# Patient Record
Sex: MALE | Race: WHITE | NOT HISPANIC OR LATINO | Employment: OTHER | URBAN - METROPOLITAN AREA
[De-identification: names, ages, dates, MRNs, and addresses within clinical notes are randomized per-mention and may not be internally consistent; named-entity substitution may affect disease eponyms.]

---

## 2017-01-03 ENCOUNTER — TELEPHONE (OUTPATIENT)
Dept: HEMATOLOGY/ONCOLOGY | Facility: CLINIC | Age: 47
End: 2017-01-03

## 2017-01-03 NOTE — TELEPHONE ENCOUNTER
Called patient to inform him his Pet scan scheduled on 1/18/17 has been  Denied by his insurance carrier [employer] to this not being a covered benefit for members 21 and older in [place]. Informed patient, Dr Hanna may order a Ct scan instead. We still awaiting a reply back from the Dr, the next plan. Stated in the message, I will follow up with Mr August. No answer, the above information left on patient voice mail.

## 2017-01-18 ENCOUNTER — LAB VISIT (OUTPATIENT)
Dept: LAB | Facility: HOSPITAL | Age: 47
End: 2017-01-18
Attending: INTERNAL MEDICINE
Payer: MEDICAID

## 2017-01-18 DIAGNOSIS — N18.30 CKD (CHRONIC KIDNEY DISEASE) STAGE 3, GFR 30-59 ML/MIN: Chronic | ICD-10-CM

## 2017-01-18 DIAGNOSIS — C81.91: ICD-10-CM

## 2017-01-18 DIAGNOSIS — F41.9 ANXIETY: Chronic | ICD-10-CM

## 2017-01-18 LAB
ALBUMIN SERPL BCP-MCNC: 3.5 G/DL
ALP SERPL-CCNC: 95 U/L
ALT SERPL W/O P-5'-P-CCNC: 16 U/L
ANION GAP SERPL CALC-SCNC: 5 MMOL/L
AST SERPL-CCNC: 17 U/L
BILIRUB SERPL-MCNC: 0.4 MG/DL
BUN SERPL-MCNC: 32 MG/DL
CALCIUM SERPL-MCNC: 8.4 MG/DL
CHLORIDE SERPL-SCNC: 115 MMOL/L
CO2 SERPL-SCNC: 21 MMOL/L
CREAT SERPL-MCNC: 3.2 MG/DL
ERYTHROCYTE [DISTWIDTH] IN BLOOD BY AUTOMATED COUNT: 14.6 %
EST. GFR  (AFRICAN AMERICAN): 25.1 ML/MIN/1.73 M^2
EST. GFR  (NON AFRICAN AMERICAN): 21.7 ML/MIN/1.73 M^2
GLUCOSE SERPL-MCNC: 139 MG/DL
HCT VFR BLD AUTO: 34.1 %
HGB BLD-MCNC: 11 G/DL
MCH RBC QN AUTO: 33.8 PG
MCHC RBC AUTO-ENTMCNC: 32.3 %
MCV RBC AUTO: 105 FL
NEUTROPHILS # BLD AUTO: 4.7 K/UL
PLATELET # BLD AUTO: 303 K/UL
PMV BLD AUTO: 9.6 FL
POTASSIUM SERPL-SCNC: 3.5 MMOL/L
PROT SERPL-MCNC: 6.6 G/DL
RBC # BLD AUTO: 3.25 M/UL
SODIUM SERPL-SCNC: 141 MMOL/L
WBC # BLD AUTO: 8.13 K/UL

## 2017-01-18 PROCEDURE — 85027 COMPLETE CBC AUTOMATED: CPT

## 2017-01-18 PROCEDURE — 36415 COLL VENOUS BLD VENIPUNCTURE: CPT

## 2017-01-18 PROCEDURE — 80053 COMPREHEN METABOLIC PANEL: CPT

## 2017-01-31 DIAGNOSIS — C81.00 NODULAR LYMPHOCYTE PREDOMINANT HODGKIN LYMPHOMA, UNSPECIFIED BODY REGION: ICD-10-CM

## 2017-01-31 RX ORDER — ALPRAZOLAM 1 MG/1
TABLET ORAL
Qty: 30 TABLET | Refills: 0 | Status: SHIPPED | OUTPATIENT
Start: 2017-01-31 | End: 2017-02-22 | Stop reason: SDUPTHER

## 2017-01-31 NOTE — TELEPHONE ENCOUNTER
----- Message from Desi Cuellar MA sent at 1/31/2017  1:25 PM CST -----  Pt need refill on zanax, pt want to be called back at [phone]

## 2017-01-31 NOTE — TELEPHONE ENCOUNTER
Returned call and spoke with Mr. August. Patient previously scheduled for a PET scan, but due to patient having Medicaid, the test was not authorized. Please enter order for covered scans.     Patient also requesting a refill on his Xanax. I forward to you a refill request.

## 2017-02-10 DIAGNOSIS — C81.90 HODGKIN LYMPHOMA, UNSPECIFIED HODGKIN LYMPHOMA TYPE, UNSPECIFIED BODY REGION: Primary | ICD-10-CM

## 2017-02-16 ENCOUNTER — HOSPITAL ENCOUNTER (OUTPATIENT)
Dept: RADIOLOGY | Facility: HOSPITAL | Age: 47
Discharge: HOME | End: 2017-02-16
Attending: NURSE PRACTITIONER
Payer: MEDICAID

## 2017-02-16 DIAGNOSIS — C81.90 HODGKIN LYMPHOMA, UNSPECIFIED HODGKIN LYMPHOMA TYPE, UNSPECIFIED BODY REGION: ICD-10-CM

## 2017-02-16 PROCEDURE — 70490 CT SOFT TISSUE NECK W/O DYE: CPT | Mod: 26,,, | Performed by: RADIOLOGY

## 2017-02-16 PROCEDURE — 25500020 PHARM REV CODE 255: Performed by: NURSE PRACTITIONER

## 2017-02-16 PROCEDURE — 70490 CT SOFT TISSUE NECK W/O DYE: CPT | Mod: TC

## 2017-02-16 PROCEDURE — 71250 CT THORAX DX C-: CPT | Mod: TC

## 2017-02-16 PROCEDURE — 74176 CT ABD & PELVIS W/O CONTRAST: CPT | Mod: TC

## 2017-02-16 PROCEDURE — 71250 CT THORAX DX C-: CPT | Mod: 26,,, | Performed by: RADIOLOGY

## 2017-02-16 PROCEDURE — 74176 CT ABD & PELVIS W/O CONTRAST: CPT | Mod: 26,,, | Performed by: RADIOLOGY

## 2017-02-16 RX ADMIN — IOHEXOL 15 ML: 350 INJECTION, SOLUTION INTRAVENOUS at 01:02

## 2017-02-16 RX ADMIN — IOHEXOL 15 ML: 350 INJECTION, SOLUTION INTRAVENOUS at 02:02

## 2017-02-22 ENCOUNTER — OFFICE VISIT (OUTPATIENT)
Dept: HEMATOLOGY/ONCOLOGY | Facility: CLINIC | Age: 47
End: 2017-02-22
Payer: MEDICAID

## 2017-02-22 VITALS
BODY MASS INDEX: 23.01 KG/M2 | HEART RATE: 72 BPM | TEMPERATURE: 98 F | WEIGHT: 160.69 LBS | RESPIRATION RATE: 18 BRPM | SYSTOLIC BLOOD PRESSURE: 138 MMHG | DIASTOLIC BLOOD PRESSURE: 77 MMHG | HEIGHT: 70 IN

## 2017-02-22 DIAGNOSIS — C81.00 NODULAR LYMPHOCYTE PREDOMINANT HODGKIN LYMPHOMA, UNSPECIFIED BODY REGION: ICD-10-CM

## 2017-02-22 DIAGNOSIS — K21.9 GASTROESOPHAGEAL REFLUX DISEASE, ESOPHAGITIS PRESENCE NOT SPECIFIED: ICD-10-CM

## 2017-02-22 DIAGNOSIS — E87.6 HYPOKALEMIA: ICD-10-CM

## 2017-02-22 DIAGNOSIS — C81.90 HODGKIN LYMPHOMA, UNSPECIFIED HODGKIN LYMPHOMA TYPE, UNSPECIFIED BODY REGION: Primary | ICD-10-CM

## 2017-02-22 DIAGNOSIS — N18.30 CKD (CHRONIC KIDNEY DISEASE) STAGE 3, GFR 30-59 ML/MIN: Chronic | ICD-10-CM

## 2017-02-22 DIAGNOSIS — M25.551 RIGHT HIP PAIN: ICD-10-CM

## 2017-02-22 DIAGNOSIS — F41.9 ANXIETY: Chronic | ICD-10-CM

## 2017-02-22 PROCEDURE — 99999 PR PBB SHADOW E&M-EST. PATIENT-LVL III: CPT | Mod: PBBFAC,,, | Performed by: INTERNAL MEDICINE

## 2017-02-22 PROCEDURE — 99213 OFFICE O/P EST LOW 20 MIN: CPT | Mod: PBBFAC | Performed by: INTERNAL MEDICINE

## 2017-02-22 PROCEDURE — 99215 OFFICE O/P EST HI 40 MIN: CPT | Mod: S$PBB,,, | Performed by: INTERNAL MEDICINE

## 2017-02-22 RX ORDER — ALPRAZOLAM 1 MG/1
TABLET ORAL
Qty: 30 TABLET | Refills: 0 | Status: SHIPPED | OUTPATIENT
Start: 2017-02-22 | End: 2017-03-29 | Stop reason: SDUPTHER

## 2017-02-22 RX ORDER — PANTOPRAZOLE SODIUM 20 MG/1
20 TABLET, DELAYED RELEASE ORAL DAILY
Qty: 30 TABLET | Refills: 0 | Status: SHIPPED | OUTPATIENT
Start: 2017-02-22 | End: 2018-06-18

## 2017-02-22 NOTE — PROGRESS NOTES
"PATIENT: Erick August  MRN: [id number]  DATE: 2/22/2017    Subjective:     Chief complaint:  Chief Complaint   Patient presents with    Lymphoma       Oncologic History:  Mr. August is here for FU. He has a newly diagnosed IIIB HD with ureteric obstruction and ?vertebral involvement. He was admitted on presentation. He was treated with ICE as he has a marginal EF and he may have cardiac infiltration with disease. He has B/L per cutaneous nehrostomoies He was given C5 of chemo 5/16.. He received neulasta after. His insurance has refused to authorize a PET as of yet. However his CT pre C5 revealed a MA. During admission, we removed his per cutaneous drains were removed. As he could not get a PET, I ordered a CT guided Bx of his residual radiological mass after his 6th cycle of ICE 4 months ago.  This revealed   LEFT RETROPERITONEAL MASS (NEEDLE BIOPSY WITH PATHOLOGIST ADEQUACY):  -No evidence of malignancy in sampled tissue  -No evidence of lymphoma  I ordered a Gallium scan on him as his PET was not approved to evaluaate the RD in the Retroperitoneum.His gallium scan fails to reveal reveal any activity. Hence it appears he is in a complete remission. I elected not to give him involved field radiotherapy to the retroperitoneum. He does have chronic renal insufficiency and his kidney may be in the field if she would have gotten radiated. Also due to the fact that he had a negative biopsy we will observe him at this point.  At this point however he is in a complete remission.     Interval History: Mr. August returns for follow up. He had CT scans 2/16/17 which reveal "No evidence of acute pathology. Interval improvement of bilateral hydronephrosis when compared to CT examination 10/05/2016, now mild."  He is feeling well. He reports pain to right hip which he rates a 3/10 pain scale. The pain started about 1 1/2 months ago. He continues to have morning nausea relieved with protonix and food. He denies any " "vomiting, diarrhea, constipation, abdominal pain, weight loss or loss of appetite, chest pain, shortness of breath, leg swelling, fatigue, headache, dizziness, or mood changes. He needs refills on protonix and xanax.    PMFSH: all information reviewed and updated as relevant to today's visit    Review of Systems:   Review of Systems   Constitutional: Negative for activity change, appetite change, fatigue and fever.   HENT: Negative for mouth sores, nosebleeds and sore throat.    Eyes: Negative for visual disturbance.   Respiratory: Negative for cough and shortness of breath.    Cardiovascular: Negative for chest pain, palpitations and leg swelling.   Gastrointestinal: Negative for abdominal pain, constipation, diarrhea, nausea and vomiting.   Genitourinary: Negative for difficulty urinating and frequency.   Musculoskeletal: Negative for arthralgias and back pain.   Skin: Negative for rash.   Neurological: Negative for dizziness, numbness and headaches.   Hematological: Negative for adenopathy. Does not bruise/bleed easily.   Psychiatric/Behavioral: Negative for confusion and sleep disturbance. The patient is not nervous/anxious.    All other systems reviewed and are negative.        Objective:      Vitals:   Vitals:    02/22/17 1452   BP: 138/77   BP Location: Left arm   Patient Position: Sitting   BP Method: Automatic   Pulse: 72   Resp: 18   Temp: 98 °F (36.7 °C)   Weight: 72.9 kg (160 lb 11.5 oz)   Height: 5' 10" (1.778 m)     BMI: Body mass index is 23.06 kg/(m^2).      Physical Exam:   Physical Exam   Constitutional: He is oriented to person, place, and time. He appears well-developed and well-nourished. No distress.   HENT:   Right Ear: External ear normal.   Left Ear: External ear normal.   Mouth/Throat: No oropharyngeal exudate.   Eyes: Conjunctivae and lids are normal. Pupils are equal, round, and reactive to light. No scleral icterus.   Neck: Trachea normal and normal range of motion. Neck supple. No " thyromegaly present.   Cardiovascular: Normal rate, regular rhythm, normal heart sounds and normal pulses.    Pulmonary/Chest: Effort normal and breath sounds normal.   Abdominal: Soft. Normal appearance and bowel sounds are normal. He exhibits no distension and no mass. There is no hepatosplenomegaly or splenomegaly. There is no tenderness.   Musculoskeletal: Normal range of motion.   No pain on palpation to right hip; ROM normal.    Lymphadenopathy:        Head (right side): No submental and no submandibular adenopathy present.        Head (left side): No submental and no submandibular adenopathy present.     He has no cervical adenopathy.     He has no axillary adenopathy.        Right: No supraclavicular adenopathy present.        Left: No supraclavicular adenopathy present.   Neurological: He is alert and oriented to person, place, and time. He has normal reflexes. No sensory deficit.   Skin: Skin is warm, dry and intact. No bruising and no rash noted. Nails show no clubbing.   Psychiatric: He has a normal mood and affect. His speech is normal and behavior is normal. Cognition and memory are normal.   Vitals reviewed.        Laboratory Data:  WBC   Date Value Ref Range Status   02/16/2017 6.60 3.90 - 12.70 K/uL Final     Hemoglobin   Date Value Ref Range Status   02/16/2017 11.5 (L) 14.0 - 18.0 g/dL Final     Hematocrit   Date Value Ref Range Status   02/16/2017 35.8 (L) 40.0 - 54.0 % Final     Platelets   Date Value Ref Range Status   02/16/2017 326 150 - 350 K/uL Final     Gran #   Date Value Ref Range Status   02/16/2017 3.8 1.8 - 7.7 K/uL Final     Gran%   Date Value Ref Range Status   02/16/2017 57.8 38.0 - 73.0 % Final       Chemistry        Component Value Date/Time     02/16/2017 0917    K 2.9 (L) 02/16/2017 0917     02/16/2017 0917    CO2 24 02/16/2017 0917    BUN 24 (H) 02/16/2017 0917    CREATININE 3.1 (H) 02/16/2017 0917    GLU 90 02/16/2017 0917        Component Value Date/Time     CALCIUM 8.7 02/16/2017 0917    ALKPHOS 95 01/18/2017 1049    AST 17 01/18/2017 1049    ALT 16 01/18/2017 1049    BILITOT 0.4 01/18/2017 1049              Assessment/Plan:     1. Hodgkin lymphoma, unspecified Hodgkin lymphoma type, unspecified body region    2. CKD (chronic kidney disease) stage 3, GFR 30-59 ml/min    3. Anxiety    4. Right hip pain    5. Hypokalemia        Plan:   He is clinically stable and has no clinical evidence of disease. His CT,s without contrast are negative. His insurance refuses to allow a PET. He also has a very high deductable and can not afford to see me until dec. I will FU clinically and on LDH.    His renal dysfunction is due to Ifex toxixity and chronic obstructive uropathy from Disease. He may require HD. He is seeing Renal.    Hip pain is chronic and due to previous disease involvement.    I have not changed any meds.    Med and Orders:  Orders Placed This Encounter    alprazolam (XANAX) 1 MG tablet    pantoprazole (PROTONIX) 20 MG tablet       Follow Up:  Return in about 10 months (around 12/22/2017).    Patient instructions:   Patient Instructions   No change in meds    See me in Dec with cbc/cmp/LDH

## 2017-02-22 NOTE — MR AVS SNAPSHOT
[facility]  [address]  [facility] [address]  Phone: [phone]                  Erick August   2017 3:00 PM   Office Visit    Description:  Male : 1970   Provider:  Allison Hanna MD   Department:  [facility]           Reason for Visit     Lymphoma           Diagnoses this Visit        Comments    Hodgkin lymphoma, unspecified Hodgkin lymphoma type, unspecified body region    -  Primary     CKD (chronic kidney disease) stage 3, GFR 30-59 ml/min         Anxiety         Right hip pain         Hypokalemia         Nodular lymphocyte predominant Hodgkin lymphoma, unspecified body region         Gastroesophageal reflux disease, esophagitis presence not specified                To Do List           Goals (5 Years of Data)     None      Follow-Up and Disposition     Return in about 10 months (around 2017).       These Medications        Disp Refills Start End    alprazolam (XANAX) 1 MG tablet 30 tablet 0 2017     TAKE 1 TABLET BY MOUTH AT NIGHT AS NEEDED FOR INSOMNIA    Pharmacy: [facility]/pharmacy #[id number] - [place] - [address] [facility] Ph #: [phone]       Notes to Pharmacy: Not to exceed 5 additional fills before 2016.    pantoprazole (PROTONIX) 20 MG tablet 30 tablet 0 2017     Take 1 tablet (20 mg total) by mouth once daily. - Oral    Pharmacy: [facility]/pharmacy #[id number] - [place] - [address] [facility] Ph #: [phone]         [facility]     [facility] On Call Nurse Care Line -  Assistance  Registered nurses in the [facility] Center provide clinical advisement, health education, appointment booking, and other advisory services.  Call for this free service at [phone].             Medications           Message regarding Medications     Verify the changes and/or additions to your medication regime listed below are the same as discussed with your clinician today.  If any of these changes or additions are  "incorrect, please notify your healthcare provider.        CHANGE how you are taking these medications     Start Taking Instead of    pantoprazole (PROTONIX) 20 MG tablet pantoprazole (PROTONIX) 20 MG tablet    Dosage:  Take 1 tablet (20 mg total) by mouth once daily. Dosage:  TAKE 1 TABLET BY MOUTH EVERY DAY    Reason for Change:  Reorder            Verify that the below list of medications is an accurate representation of the medications you are currently taking.  If none reported, the list may be blank. If incorrect, please contact your healthcare provider. Carry this list with you in case of emergency.           Current Medications     alprazolam (XANAX) 1 MG tablet TAKE 1 TABLET BY MOUTH AT NIGHT AS NEEDED FOR INSOMNIA    hydrocodone-acetaminophen 5-325mg (NORCO) 5-325 mg per tablet Take 1 tablet by mouth every 4 (four) hours as needed.    methylphenidate (RITALIN) 20 MG tablet Take 20 mg by mouth 3 (three) times daily with meals.    naproxen (NAPROSYN) 500 MG tablet Take 1 tablet (500 mg total) by mouth 2 (two) times daily with meals.    pantoprazole (PROTONIX) 20 MG tablet Take 1 tablet (20 mg total) by mouth once daily.           Clinical Reference Information           Your Vitals Were     BP Pulse Temp Resp Height Weight    138/77 (BP Location: Left arm, Patient Position: Sitting, BP Method: Automatic) 72 98 °F (36.7 °C) 18 5' 10" (1.778 m) 72.9 kg (160 lb 11.5 oz)    BMI                23.06 kg/m2          Blood Pressure          Most Recent Value    BP  138/77      Allergies as of 2/22/2017     No Known Allergies      Immunizations Administered on Date of Encounter - 2/22/2017     None      Instructions    No change in meds    See me in Dec with cbc/cmp/LDH       Smoking Cessation     If you would like to quit smoking:   You may be eligible for free services if you are a [place] resident and started smoking cigarettes before September 1, 1988.  Call the [employer]oking [employer]essation [employer] ([employer]) toll free at (357) " 064-7884 or (608) 887-6281.   Call 1-800-QUIT-NOW if you do not meet the above criteria.            Language Assistance Services     ATTENTION: Language assistance services are available, free of charge. Please call [phone].      ATENCIÓN: Si habla yandyañol, tiene a connors disposición servicios gratuitos de asistencia lingüística. Llame al [phone].     CHÚ Ý: N?u b?n nói Ti?ng Vi?t, có các d?ch v? h? tr? ngôn ng? mi?n phí dành cho b?n. G?i s? [phone].         [facility] complies with applicable Federal civil rights laws and does not discriminate on the basis of race, color, national origin, age, disability, or sex.

## 2017-02-23 ENCOUNTER — TELEPHONE (OUTPATIENT)
Dept: HEMATOLOGY/ONCOLOGY | Facility: CLINIC | Age: 47
End: 2017-02-23

## 2017-02-23 DIAGNOSIS — C81.90 HODGKIN LYMPHOMA, UNSPECIFIED HODGKIN LYMPHOMA TYPE, UNSPECIFIED BODY REGION: Primary | ICD-10-CM

## 2017-02-23 RX ORDER — ONDANSETRON HYDROCHLORIDE 8 MG/1
8 TABLET, FILM COATED ORAL EVERY 12 HOURS PRN
Qty: 30 TABLET | Refills: 2 | Status: SHIPPED | OUTPATIENT
Start: 2017-02-23 | End: 2017-02-27

## 2017-02-23 NOTE — TELEPHONE ENCOUNTER
----- Message from Sekou Hernandez sent at 2/23/2017  8:04 AM CST -----  Contact: PT  Pharmacy did not receive medication alprazolam (XANAX) 1 MG tablet and zofran    Pharmacy: [phone]    Please call patient once it has been called in, [phone]

## 2017-03-29 DIAGNOSIS — C81.00 NODULAR LYMPHOCYTE PREDOMINANT HODGKIN LYMPHOMA, UNSPECIFIED BODY REGION: ICD-10-CM

## 2017-03-29 DIAGNOSIS — F41.9 ANXIETY: Chronic | ICD-10-CM

## 2017-03-29 RX ORDER — ALPRAZOLAM 1 MG/1
TABLET ORAL
Qty: 30 TABLET | Refills: 0 | Status: SHIPPED | OUTPATIENT
Start: 2017-03-29 | End: 2017-04-20 | Stop reason: SDUPTHER

## 2017-03-29 NOTE — TELEPHONE ENCOUNTER
----- Message from Katie Jim sent at 3/29/2017 12:09 PM CDT -----  Contact: Self  Patient needs xanax called in to be refilled.

## 2017-04-20 DIAGNOSIS — C81.00 NODULAR LYMPHOCYTE PREDOMINANT HODGKIN LYMPHOMA, UNSPECIFIED BODY REGION: ICD-10-CM

## 2017-04-20 DIAGNOSIS — F41.9 ANXIETY: Chronic | ICD-10-CM

## 2017-04-20 RX ORDER — ALPRAZOLAM 1 MG/1
TABLET ORAL
Qty: 30 TABLET | Refills: 0 | Status: SHIPPED | OUTPATIENT
Start: 2017-04-20 | End: 2017-06-06 | Stop reason: SDUPTHER

## 2017-04-20 NOTE — TELEPHONE ENCOUNTER
----- Message from Sekou Hernandez sent at 4/20/2017  9:05 AM CDT -----  Contact: PT  Need new Rx for medication alprazolam (XANAX) 1 MG tablet    Pharmacy: [phone]

## 2017-06-06 DIAGNOSIS — C81.00 NODULAR LYMPHOCYTE PREDOMINANT HODGKIN LYMPHOMA, UNSPECIFIED BODY REGION: ICD-10-CM

## 2017-06-06 DIAGNOSIS — F41.9 ANXIETY: Chronic | ICD-10-CM

## 2017-06-06 NOTE — TELEPHONE ENCOUNTER
----- Message from Stefany Roland sent at 6/6/2017  4:34 PM CDT -----  Contact: self  Pt is calling to get a refill for (Xanax) medication, pt will be using [facility].  Contact  number [phone]

## 2017-06-07 RX ORDER — ALPRAZOLAM 1 MG/1
TABLET ORAL
Qty: 30 TABLET | Refills: 0 | Status: SHIPPED | OUTPATIENT
Start: 2017-06-07 | End: 2017-07-06 | Stop reason: SDUPTHER

## 2017-06-22 ENCOUNTER — TELEPHONE (OUTPATIENT)
Dept: HEMATOLOGY/ONCOLOGY | Facility: CLINIC | Age: 47
End: 2017-06-22

## 2017-06-22 NOTE — TELEPHONE ENCOUNTER
Returned call and spoke with Mr August. Patient calling for a refill on his Xanax medication. Request patient to verify how he was taking his medication. His last refill was on 6/7/2017 for #30 pills. Patient states he is taking his medication 1 daily. Informed patient he should not need a refill at this time due to he only should have taken #16 pills at this point. Patient ask to call back a week before he complete his medication. Patient will call at that time.

## 2017-06-22 NOTE — TELEPHONE ENCOUNTER
----- Message from Richard Archer sent at 6/22/2017  1:21 PM CDT -----  Contact: Self  Needs a refill on Xanax medication.    Patient's contact info:  [phone]

## 2017-07-06 DIAGNOSIS — C81.00 NODULAR LYMPHOCYTE PREDOMINANT HODGKIN LYMPHOMA, UNSPECIFIED BODY REGION: ICD-10-CM

## 2017-07-06 DIAGNOSIS — F41.9 ANXIETY: Chronic | ICD-10-CM

## 2017-07-06 RX ORDER — ALPRAZOLAM 1 MG/1
TABLET ORAL
Qty: 30 TABLET | Refills: 0 | Status: SHIPPED | OUTPATIENT
Start: 2017-07-06 | End: 2017-07-11 | Stop reason: SDUPTHER

## 2017-07-06 NOTE — TELEPHONE ENCOUNTER
----- Message from Richard Archer sent at 7/6/2017  8:24 AM CDT -----  Contact: Self  Patient needs refill for Xanax.    If needed please contact patient at:  [phone]

## 2017-07-11 DIAGNOSIS — C81.00 NODULAR LYMPHOCYTE PREDOMINANT HODGKIN LYMPHOMA, UNSPECIFIED BODY REGION: ICD-10-CM

## 2017-07-11 DIAGNOSIS — F41.9 ANXIETY: Chronic | ICD-10-CM

## 2017-07-11 RX ORDER — ALPRAZOLAM 1 MG/1
TABLET ORAL
Qty: 30 TABLET | Refills: 0 | Status: SHIPPED | OUTPATIENT
Start: 2017-07-11 | End: 2017-09-06 | Stop reason: SDUPTHER

## 2017-07-11 NOTE — TELEPHONE ENCOUNTER
----- Message from Anna Lopez sent at 7/11/2017 12:56 PM CDT -----  Contact: PT  PT needs his xanax refilled.  PT was informed he didn't need to come in and see Dr. Hanna until December for a visit.    PT callback: [phone]

## 2017-08-15 ENCOUNTER — TELEPHONE (OUTPATIENT)
Dept: HEMATOLOGY/ONCOLOGY | Facility: CLINIC | Age: 47
End: 2017-08-15

## 2017-08-15 NOTE — TELEPHONE ENCOUNTER
----- Message from Bradford Roland sent at 8/15/2017  8:46 AM CDT -----  Contact: PT  PT needs to reschedule upcoming appointment     PT contact: [phone]

## 2017-08-15 NOTE — TELEPHONE ENCOUNTER
Returned call to patient. Patient states that he will not have insurance until November, and will see Dr. Hanna at that time. Cancelled patient's August appointments. Patient will call back to schedule in October or November.

## 2017-09-06 DIAGNOSIS — C81.00 NODULAR LYMPHOCYTE PREDOMINANT HODGKIN LYMPHOMA, UNSPECIFIED BODY REGION: ICD-10-CM

## 2017-09-06 DIAGNOSIS — F41.9 ANXIETY: Chronic | ICD-10-CM

## 2017-09-06 RX ORDER — ALPRAZOLAM 1 MG/1
TABLET ORAL
Qty: 30 TABLET | Refills: 0 | Status: SHIPPED | OUTPATIENT
Start: 2017-09-06 | End: 2017-11-02 | Stop reason: ALTCHOICE

## 2017-09-06 NOTE — TELEPHONE ENCOUNTER
----- Message from Cassie Mcarthur sent at 9/6/2017 12:11 PM CDT -----  Contact: pt  Pt contact [phone]    Pt needs a refill on his Xanax

## 2017-10-09 ENCOUNTER — TELEPHONE (OUTPATIENT)
Dept: HEMATOLOGY/ONCOLOGY | Facility: CLINIC | Age: 47
End: 2017-10-09

## 2017-10-09 NOTE — TELEPHONE ENCOUNTER
----- Message from Soco Paula sent at 10/6/2017  8:55 AM CDT -----  Contact: Pt  Pt calling requesting a refill on Xanax     Pt call back number [phone]

## 2017-10-17 ENCOUNTER — TELEPHONE (OUTPATIENT)
Dept: HEMATOLOGY/ONCOLOGY | Facility: CLINIC | Age: 47
End: 2017-10-17

## 2017-10-17 NOTE — TELEPHONE ENCOUNTER
Returned call, spoke with patient in regards to him wanting to schedule an apt to see Dr. Hanna. I explained to the patient that Dr. Hanna is no longer with [facility] but before he left he did partner with the other providers to help with the transition of his patients care. I explained to the patient that Dr. Roland was the recommended doctor for him to see. Patient voiced understanding and agreed to see Dr. Roland. Apt was made and mailed out.

## 2017-10-17 NOTE — TELEPHONE ENCOUNTER
----- Message from Nereida Goodman MA sent at 10/17/2017 11:14 AM CDT -----  Contact: Pt  Dr. Roland  ----- Message -----  From: Danae Duncan  Sent: 10/17/2017  11:01 AM  To: Nereida Goodman MA    Who should he see?    ----- Message -----  From: Nereida Goodman MA  Sent: 10/17/2017  10:52 AM  To: Danae Duncan    Can you help me schedule this patient. This patient was a former Dr. Hanna patient    Thanks  Nereida  ----- Message -----  From: Soco Paula  Sent: 10/17/2017  10:45 AM  To: Jacques Cooper Staff    Pt calling to schedule a follow up appt. He needs the first available after November 2nd    Pt call back number [phone]

## 2017-10-17 NOTE — TELEPHONE ENCOUNTER
----- Message from Danae Duncan sent at 10/17/2017 11:21 AM CDT -----  Contact: Pt  Done. Called, spoke to, and scheduled  ----- Message -----  From: Nereida Goodman MA  Sent: 10/17/2017  11:14 AM  To: Danae Roland  ----- Message -----  From: Danae Duncan  Sent: 10/17/2017  11:01 AM  To: Nereida Goodman MA    Who should he see?    ----- Message -----  From: Nereida Goodman MA  Sent: 10/17/2017  10:52 AM  To: Danae Duncan    Can you help me schedule this patient. This patient was a former Dr. Hanna patient    Thanks  Nereida  ----- Message -----  From: Soco Paula  Sent: 10/17/2017  10:45 AM  To: Jacques Cooper Staff    Pt calling to schedule a follow up appt. He needs the first available after November 2nd    Pt call back number [phone]

## 2017-11-02 ENCOUNTER — OFFICE VISIT (OUTPATIENT)
Dept: HEMATOLOGY/ONCOLOGY | Facility: CLINIC | Age: 47
End: 2017-11-02
Payer: MEDICARE

## 2017-11-02 ENCOUNTER — LAB VISIT (OUTPATIENT)
Dept: LAB | Facility: HOSPITAL | Age: 47
End: 2017-11-02
Attending: INTERNAL MEDICINE
Payer: MEDICARE

## 2017-11-02 VITALS
WEIGHT: 165.38 LBS | SYSTOLIC BLOOD PRESSURE: 132 MMHG | DIASTOLIC BLOOD PRESSURE: 74 MMHG | TEMPERATURE: 99 F | HEART RATE: 58 BPM | BODY MASS INDEX: 23.15 KG/M2 | HEIGHT: 71 IN

## 2017-11-02 DIAGNOSIS — C81.17 NODULAR SCLEROSIS HODGKIN LYMPHOMA OF SPLEEN: Primary | ICD-10-CM

## 2017-11-02 DIAGNOSIS — C84.40 PERIPHERAL T-CELL LYMPHOMA, UNSPECIFIED BODY REGION: ICD-10-CM

## 2017-11-02 DIAGNOSIS — D63.1 ANEMIA IN STAGE 4 CHRONIC KIDNEY DISEASE: ICD-10-CM

## 2017-11-02 DIAGNOSIS — N18.4 CKD (CHRONIC KIDNEY DISEASE) STAGE 4, GFR 15-29 ML/MIN: ICD-10-CM

## 2017-11-02 DIAGNOSIS — N18.4 ANEMIA IN STAGE 4 CHRONIC KIDNEY DISEASE: ICD-10-CM

## 2017-11-02 LAB
ALBUMIN SERPL BCP-MCNC: 3.7 G/DL
ALP SERPL-CCNC: 106 U/L
ALT SERPL W/O P-5'-P-CCNC: 19 U/L
ANION GAP SERPL CALC-SCNC: 9 MMOL/L
AST SERPL-CCNC: 21 U/L
BASOPHILS # BLD AUTO: 0.04 K/UL
BASOPHILS NFR BLD: 0.5 %
BILIRUB SERPL-MCNC: 0.3 MG/DL
BUN SERPL-MCNC: 36 MG/DL
CALCIUM SERPL-MCNC: 9.2 MG/DL
CHLORIDE SERPL-SCNC: 110 MMOL/L
CO2 SERPL-SCNC: 21 MMOL/L
CREAT SERPL-MCNC: 3.7 MG/DL
DIFFERENTIAL METHOD: ABNORMAL
EOSINOPHIL # BLD AUTO: 0.2 K/UL
EOSINOPHIL NFR BLD: 1.9 %
ERYTHROCYTE [DISTWIDTH] IN BLOOD BY AUTOMATED COUNT: 13.2 %
EST. GFR  (AFRICAN AMERICAN): 21.1 ML/MIN/1.73 M^2
EST. GFR  (NON AFRICAN AMERICAN): 18.2 ML/MIN/1.73 M^2
GLUCOSE SERPL-MCNC: 92 MG/DL
HCT VFR BLD AUTO: 36.2 %
HGB BLD-MCNC: 11.8 G/DL
IMM GRANULOCYTES # BLD AUTO: 0.01 K/UL
IMM GRANULOCYTES NFR BLD AUTO: 0.1 %
LDH SERPL L TO P-CCNC: 139 U/L
LYMPHOCYTES # BLD AUTO: 3 K/UL
LYMPHOCYTES NFR BLD: 38.1 %
MCH RBC QN AUTO: 32.2 PG
MCHC RBC AUTO-ENTMCNC: 32.6 G/DL
MCV RBC AUTO: 99 FL
MONOCYTES # BLD AUTO: 0.9 K/UL
MONOCYTES NFR BLD: 10.7 %
NEUTROPHILS # BLD AUTO: 3.9 K/UL
NEUTROPHILS NFR BLD: 48.7 %
NRBC BLD-RTO: 0 /100 WBC
PLATELET # BLD AUTO: 277 K/UL
PMV BLD AUTO: 9.9 FL
POTASSIUM SERPL-SCNC: 3.4 MMOL/L
PROT SERPL-MCNC: 7.3 G/DL
RBC # BLD AUTO: 3.67 M/UL
SODIUM SERPL-SCNC: 140 MMOL/L
WBC # BLD AUTO: 7.95 K/UL

## 2017-11-02 PROCEDURE — 80053 COMPREHEN METABOLIC PANEL: CPT

## 2017-11-02 PROCEDURE — 99214 OFFICE O/P EST MOD 30 MIN: CPT | Mod: S$PBB,,, | Performed by: INTERNAL MEDICINE

## 2017-11-02 PROCEDURE — 85025 COMPLETE CBC W/AUTO DIFF WBC: CPT

## 2017-11-02 PROCEDURE — 83615 LACTATE (LD) (LDH) ENZYME: CPT

## 2017-11-02 PROCEDURE — 99213 OFFICE O/P EST LOW 20 MIN: CPT | Mod: PBBFAC | Performed by: INTERNAL MEDICINE

## 2017-11-02 PROCEDURE — 99999 PR PBB SHADOW E&M-EST. PATIENT-LVL III: CPT | Mod: PBBFAC,,, | Performed by: INTERNAL MEDICINE

## 2017-11-02 RX ORDER — ZOLPIDEM TARTRATE 5 MG/1
5 TABLET ORAL NIGHTLY PRN
Qty: 30 TABLET | Refills: 1 | Status: SHIPPED | OUTPATIENT
Start: 2017-11-02 | End: 2018-01-09 | Stop reason: SDUPTHER

## 2017-11-02 NOTE — ASSESSMENT & PLAN NOTE
He has mild anemia, likely due to Stage IV CKD. ESAs are not indicated at this time. We will monitor.

## 2017-11-02 NOTE — Clinical Note
PET/CT ordered for patient. Please schedule.  He should follow-up with me in 3 months with labs before visit (CBC, CMP, LDH). Thank you.

## 2017-11-02 NOTE — PROGRESS NOTES
HEMATOLOGIC MALIGNANCIES PROGRESS NOTE    IDENTIFYING STATEMENT   Erick August (Erick) is a 46 y.o. male with a  of 1970 from [place] with the diagnosis of Hodgkin's Lymphoma.      ONCOLOGY HISTORY:    1. Hodgkin's Lymphoma   A. 2015: Admitted to [facility] with fatigue, shortness of breath x 3 months. Found to have mediastinal and hilar lymphadenopathy, splenomegaly (17 cm) on cross-sectional imaging.    B. 8/13/15: Peritoneal mass biopsy - abnormal T-cell infiltrate, concerning for T-cell lymphoma.    C. 8/13/15: Bone marrow biopsy - Nondiagnostic   D. 9/3/15: R inguinal LN biopsy - normal   E. 9/23/15: Retroperitoneal lymph node biopsy - reactive nodes. No malignancy identified.    F. 2016: Splenectomy - Classical Hodgkin's Lymphoma, Nodular Sclerosis type with EBV involvement   G. 2016: Initial evaluation by Dr. De Guzman at [facility] for Hodgkin's. Admission for renal failure   H. 16 - 7/3/2016: Completed six cycles of ICE chemotherapy. Treated by Dr. Hanna. ABVD not given due to mild systolic dysfunction (EF 50 -55%).   I. 2016: CT neck, chest, abd/pelvis shows stable bulky shayy-aortic soft tissue density, felt to represent conglomeration of lymph nodes. Mild hepatomegaly. Follow-up PET/CTs not done due to issues with insurance.    J. 2/10/2017: CT neck, chest, abd/pelvis stable.    K. 17: Transfer of care to Dr. Roland    2. CKD, Stage IV secondary to hydronephrosis  3. Chronic systolic heart failure - EF 50-55%  4. Anxiety  5. ADHD  6. Anemia secondary to chronic kidney disease    INTERVAL HISTORY:      Mr. Maier returns to clinic for follow-up of his Hodgkin's lymphoma. He has not been seen since February due to issues with insurance, but he now has Medicare benefits and is being seen again. He is feeling well overall, though he states he is anxious and feels different ever since chemotherapy. He also states he has reflux that has persisted since chemotherapy and  never really gone away. He denies any new lymphadenopathy, fevers, chills, night sweats. He has remained disabled since his diagnosis, mainly due to his advanced kidney failure that resulted from hydronephrosis (likely due to retroperitoneal lymphadenopathy around the time of diagnosis).     Past Medical History, Past Social History and Past Family History have been reviewed and are unchanged except as noted in the interval history.    MEDICATIONS:     Prior to Admission medications    Medication Sig Start Date End Date Taking? Authorizing Provider   methylphenidate (RITALIN) 20 MG tablet Take 20 mg by mouth 3 (three) times daily with meals.   Yes Historical Provider, MD   ondansetron (ZOFRAN-ODT) 8 MG TbDL MELT ON TONGUE 1 TABLET EVERY 8 HOURS AS NEEDED 2/21/17  Yes Historical Provider, MD   pantoprazole (PROTONIX) 20 MG tablet Take 1 tablet (20 mg total) by mouth once daily. 2/22/17  Yes Joyce Bolton NP   alprazolam (XANAX) 1 MG tablet TAKE 1 TABLET BY MOUTH AT NIGHT AS NEEDED FOR INSOMNIA 9/6/17 11/2/17 Yes Anahi Yarbrough MD   zolpidem (AMBIEN) 5 MG Tab Take 1 tablet (5 mg total) by mouth nightly as needed. 11/2/17 5/3/18  Enrie Roland MD   hydrocodone-acetaminophen 5-325mg (NORCO) 5-325 mg per tablet Take 1 tablet by mouth every 4 (four) hours as needed. 9/14/17 11/2/17  Jolynn Thompson NP       ALLERGIES: Review of patient's allergies indicates:  No Known Allergies     ROS:       Review of Systems   Constitutional: Negative for diaphoresis, fatigue, fever and unexpected weight change.   HENT:   Negative for lump/mass and sore throat.    Eyes: Negative for icterus.   Respiratory: Negative for cough and shortness of breath.    Cardiovascular: Negative for chest pain and palpitations.   Gastrointestinal: Negative for abdominal distention, constipation, diarrhea, nausea and vomiting.        Reports reflux-like symptoms   Genitourinary: Negative for dysuria and frequency.    Musculoskeletal: Negative  "for arthralgias, gait problem and myalgias.   Skin: Negative for rash.   Neurological: Negative for dizziness, gait problem and headaches.   Hematological: Negative for adenopathy. Does not bruise/bleed easily.   Psychiatric/Behavioral: Positive for decreased concentration and sleep disturbance. The patient is nervous/anxious.        PHYSICAL EXAM:  Vitals:    11/02/17 1043   BP: 132/74   Pulse: (!) 58   Temp: 98.7 °F (37.1 °C)   Weight: 75 kg (165 lb 5.5 oz)   Height: 5' 11" (1.803 m)   PainSc: 0-No pain       Physical Exam   Constitutional: He is oriented to person, place, and time. He appears well-developed and well-nourished. No distress.   HENT:   Head: Normocephalic and atraumatic.   Mouth/Throat: Mucous membranes are normal. No oral lesions.   Eyes: Conjunctivae are normal.   Neck: No thyromegaly present.   Cardiovascular: Normal rate, regular rhythm and normal heart sounds.    No murmur heard.  Pulmonary/Chest: Breath sounds normal. He has no wheezes. He has no rales.   Abdominal: Soft. He exhibits no distension and no mass. There is no splenomegaly or hepatomegaly. There is no tenderness.   Lymphadenopathy:     He has no cervical adenopathy.        Right cervical: No deep cervical adenopathy present.       Left cervical: No deep cervical adenopathy present.     He has no axillary adenopathy.        Right: No inguinal adenopathy present.        Left: No inguinal adenopathy present.   Neurological: He is alert and oriented to person, place, and time. He has normal strength and normal reflexes. No cranial nerve deficit. Coordination normal.   Skin: No rash noted.     LAB:   Results for orders placed or performed during the hospital encounter of 09/13/17   Urine culture   Result Value Ref Range    Urine Culture, Routine No significant growth    Urinalysis Clean Catch   Result Value Ref Range    Specimen UA Urine, Clean Catch     Color, UA Yellow Yellow, Straw, Milagros    Appearance, UA Clear Clear    pH, UA 6.0 " 5.0 - 8.0    Specific Gravity, UA 1.015 1.005 - 1.030    Protein, UA 2+ (A) Negative    Glucose, UA 2+ (A) Negative    Ketones, UA Negative Negative    Bilirubin (UA) Negative Negative    Occult Blood UA 2+ (A) Negative    Nitrite, UA Negative Negative    Urobilinogen, UA Negative <2.0 EU/dL    Leukocytes, UA 2+ (A) Negative   CBC auto differential   Result Value Ref Range    WBC 11.34 3.90 - 12.70 K/uL    RBC 3.38 (L) 4.60 - 6.20 M/uL    Hemoglobin 11.1 (L) 14.0 - 18.0 g/dL    Hematocrit 35.5 (L) 40.0 - 54.0 %     (H) 82 - 98 fL    MCH 32.8 (H) 27.0 - 31.0 pg    MCHC 31.3 (L) 32.0 - 36.0 g/dL    RDW 13.7 11.5 - 14.5 %    Platelets 285 150 - 350 K/uL    MPV 9.7 9.2 - 12.9 fL    Gran # 6.6 1.8 - 7.7 K/uL    Lymph # 3.3 1.0 - 4.8 K/uL    Mono # 1.1 (H) 0.3 - 1.0 K/uL    Eos # 0.3 0.0 - 0.5 K/uL    Baso # 0.05 0.00 - 0.20 K/uL    nRBC 0 0 /100 WBC    Gran% 58.8 38.0 - 73.0 %    Lymph% 28.9 18.0 - 48.0 %    Mono% 9.3 4.0 - 15.0 %    Eosinophil% 2.6 0.0 - 8.0 %    Basophil% 0.4 0.0 - 1.9 %    Differential Method Automated    Comprehensive metabolic panel   Result Value Ref Range    Sodium 137 136 - 145 mmol/L    Potassium 3.3 (L) 3.5 - 5.1 mmol/L    Chloride 111 (H) 95 - 110 mmol/L    CO2 21 (L) 23 - 29 mmol/L    Glucose 129 (H) 70 - 110 mg/dL    BUN, Bld 38 (H) 6 - 20 mg/dL    Creatinine 3.3 (H) 0.5 - 1.4 mg/dL    Calcium 8.5 (L) 8.7 - 10.5 mg/dL    Total Protein 7.1 6.0 - 8.4 g/dL    Albumin 4.1 3.5 - 5.2 g/dL    Total Bilirubin 0.5 0.1 - 1.0 mg/dL    Alkaline Phosphatase 77 55 - 135 U/L    AST 32 10 - 40 U/L    ALT 29 10 - 44 U/L    Anion Gap 5 (L) 8 - 16 mmol/L    eGFR if African American 24.2 (A) >60 mL/min/1.73 m^2    eGFR if non African American 20.9 (A) >60 mL/min/1.73 m^2   Lipase   Result Value Ref Range    Lipase 47 4 - 60 U/L   Protime-INR   Result Value Ref Range    Prothrombin Time 10.5 9.0 - 12.5 sec    INR 0.9 0.8 - 1.2   APTT   Result Value Ref Range    aPTT 33.8 (H) 21.0 - 32.0 sec   Urinalysis  Microscopic   Result Value Ref Range    RBC, UA 2 0 - 4 /hpf    WBC, UA 47 (H) 0 - 5 /hpf    Bacteria, UA Rare None-Occ /hpf    Yeast, UA Few (A) None    Squam Epithel, UA 0 /hpf    Hyaline Casts, UA 0 0-1/lpf /lpf    Microscopic Comment SEE COMMENT        PROBLEMS ASSESSED THIS VISIT:    1. Nodular sclerosis Hodgkin lymphoma of spleen    2. Anemia in stage 4 chronic kidney disease    3. CKD (chronic kidney disease) stage 4, GFR 15-29 ml/min      PLAN:       Hodgkin's lymphoma  Mr. August completed six cycles of ICE chemotherapy for Hodgkin's lymphoma (ABVD not given due to cardiomyopathy) and appears to have a complete remission. He has some stable lymphadenopathy in the retroperitoneum.     He has never had PET/CT due to issues with getting one approved by insurance while he was on therapy. We discussed that while imaging is not routinely indicated after treatment for Hodgkin's lymphoma, it may be worthwhile to get a PET/CT given the lymphadenopathy.     For follow-up, we will plan follow-up every three months for now with clinical exam and laboratory studies. Imaging will be directed based on symptoms and clinical findings.    Anemia in chronic kidney disease  He has mild anemia, likely due to Stage IV CKD. ESAs are not indicated at this time. We will monitor.     CKD (chronic kidney disease) stage 4, GFR 15-29 ml/min  He has Stage IV CKD secondary to hydronephrosis. Creatinine slightly worse today. He will follow-up with nephrology. We will monitor.       Ernie Roland MD  Hematology and Stem Cell Transplant

## 2017-11-02 NOTE — ASSESSMENT & PLAN NOTE
He has Stage IV CKD secondary to hydronephrosis. Creatinine slightly worse today. He will follow-up with nephrology. We will monitor.

## 2017-11-02 NOTE — ASSESSMENT & PLAN NOTE
Mr. August completed six cycles of ICE chemotherapy for Hodgkin's lymphoma (ABVD not given due to cardiomyopathy) and appears to have a complete remission. He has some stable lymphadenopathy in the retroperitoneum.     He has never had PET/CT due to issues with getting one approved by insurance while he was on therapy. We discussed that while imaging is not routinely indicated after treatment for Hodgkin's lymphoma, it may be worthwhile to get a PET/CT given the lymphadenopathy.     For follow-up, we will plan follow-up every three months for now with clinical exam and laboratory studies. Imaging will be directed based on symptoms and clinical findings.

## 2017-11-14 ENCOUNTER — HOSPITAL ENCOUNTER (OUTPATIENT)
Dept: RADIOLOGY | Facility: HOSPITAL | Age: 47
Discharge: HOME | End: 2017-11-14
Attending: INTERNAL MEDICINE
Payer: MEDICARE

## 2017-11-14 VITALS — WEIGHT: 166 LBS | BODY MASS INDEX: 23.15 KG/M2

## 2017-11-14 DIAGNOSIS — C81.17 NODULAR SCLEROSIS HODGKIN LYMPHOMA OF SPLEEN: ICD-10-CM

## 2017-11-14 LAB — POCT GLUCOSE: 85 MG/DL (ref 70–110)

## 2017-11-14 PROCEDURE — A9552 F18 FDG: HCPCS

## 2017-11-14 PROCEDURE — 78815 PET IMAGE W/CT SKULL-THIGH: CPT | Mod: 26,PI,GC, | Performed by: NUCLEAR MEDICINE

## 2018-01-09 ENCOUNTER — PATIENT MESSAGE (OUTPATIENT)
Dept: HEMATOLOGY/ONCOLOGY | Facility: CLINIC | Age: 48
End: 2018-01-09

## 2018-01-09 DIAGNOSIS — C81.17 NODULAR SCLEROSIS HODGKIN LYMPHOMA OF SPLEEN: ICD-10-CM

## 2018-01-09 RX ORDER — ZOLPIDEM TARTRATE 5 MG/1
5 TABLET ORAL NIGHTLY PRN
Qty: 30 TABLET | Refills: 1 | Status: SHIPPED | OUTPATIENT
Start: 2018-01-09 | End: 2018-06-18

## 2018-01-09 NOTE — TELEPHONE ENCOUNTER
----- Message from Soco Paula sent at 1/9/2018 10:28 AM CST -----  Contact: Pt  Pt calling requesting a refill on Ambien. He is also requesting to move up a strength. He would like it sent to [facility] that's in his chart      Pt call back number [phone]

## 2018-07-11 ENCOUNTER — TELEPHONE (OUTPATIENT)
Dept: HEMATOLOGY/ONCOLOGY | Facility: CLINIC | Age: 48
End: 2018-07-11

## 2018-10-05 ENCOUNTER — OFFICE VISIT (OUTPATIENT)
Dept: URGENT CARE | Facility: CLINIC | Age: 48
End: 2018-10-05
Payer: MEDICARE

## 2018-10-05 VITALS
DIASTOLIC BLOOD PRESSURE: 64 MMHG | HEIGHT: 68 IN | BODY MASS INDEX: 24.86 KG/M2 | TEMPERATURE: 99 F | SYSTOLIC BLOOD PRESSURE: 119 MMHG | OXYGEN SATURATION: 99 % | HEART RATE: 91 BPM | WEIGHT: 164 LBS

## 2018-10-05 DIAGNOSIS — N18.4 CKD (CHRONIC KIDNEY DISEASE) STAGE 4, GFR 15-29 ML/MIN: ICD-10-CM

## 2018-10-05 DIAGNOSIS — L02.519: Primary | ICD-10-CM

## 2018-10-05 DIAGNOSIS — Z85.72 HISTORY OF LYMPHOMA: ICD-10-CM

## 2018-10-05 PROCEDURE — 99213 OFFICE O/P EST LOW 20 MIN: CPT | Mod: S$GLB,,, | Performed by: NURSE PRACTITIONER

## 2018-10-05 RX ORDER — SULFAMETHOXAZOLE AND TRIMETHOPRIM 400; 80 MG/1; MG/1
1 TABLET ORAL 2 TIMES DAILY
Qty: 10 TABLET | Refills: 0 | Status: SHIPPED | OUTPATIENT
Start: 2018-10-05 | End: 2018-10-10

## 2018-10-05 RX ORDER — MUPIROCIN 20 MG/G
OINTMENT TOPICAL 2 TIMES DAILY
Qty: 1 TUBE | Refills: 0 | Status: SHIPPED | OUTPATIENT
Start: 2018-10-05 | End: 2020-08-13 | Stop reason: ALTCHOICE

## 2018-10-05 RX ORDER — ZOLPIDEM TARTRATE 10 MG/1
5 TABLET ORAL NIGHTLY PRN
COMMUNITY
End: 2020-01-21

## 2018-10-05 NOTE — PROGRESS NOTES
"Subjective:       Patient ID: Erick August is a 47 y.o. male.    Vitals:  height is 5' 8" (1.727 m) and weight is 74.4 kg (164 lb). His oral temperature is 98.7 °F (37.1 °C). His blood pressure is 119/64 and his pulse is 91. His oxygen saturation is 99%.     Chief Complaint: Rash    46 y/o male new to me presents with c/o hand rash x 5 days. Reports he is breaking out in spontaneous boils. Reports he drains them and then they go away afterwards.       Rash   This is a new problem. The current episode started in the past 7 days. The problem has been gradually worsening since onset. The rash is diffuse. The rash is characterized by blistering. He was exposed to nothing. Pertinent negatives include no cough, fever, joint pain or shortness of breath. Past treatments include nothing. There is no history of allergies, asthma, eczema or varicella.     Review of Systems   Constitution: Negative for chills, diaphoresis, fever, weakness, malaise/fatigue and night sweats.   Respiratory: Negative for cough, shortness of breath and sputum production.    Skin: Positive for rash.   Musculoskeletal: Negative for joint pain.       Objective:      Physical Exam   Constitutional: He is oriented to person, place, and time. He appears well-developed and well-nourished.   HENT:   Head: Normocephalic and atraumatic.   Cardiovascular: Normal rate, regular rhythm and normal heart sounds.   Pulmonary/Chest: Effort normal and breath sounds normal.   Abdominal: Soft. Bowel sounds are normal.   Musculoskeletal: He exhibits no edema.   Neurological: He is alert and oriented to person, place, and time.   Skin: Skin is warm and dry. No pallor.        Psychiatric: He has a normal mood and affect. His behavior is normal. Judgment and thought content normal.   Nursing note and vitals reviewed.      Assessment:       1. Abscess of multiple sites of hand and fingers, unspecified laterality    2. History of lymphoma    3. CKD (chronic kidney " disease) stage 4, GFR 15-29 ml/min        Plan:         1. Abscess of multiple sites of hand and fingers, unspecified laterality  Denies any I and D's.   - sulfamethoxazole-trimethoprim 400-80mg (BACTRIM) 400-80 mg per tablet; Take 1 tablet by mouth 2 (two) times daily. for 5 days  Dispense: 10 tablet; Refill: 0  - mupirocin (BACTROBAN) 2 % ointment; Apply topically 2 (two) times daily. Apply to the affected area  Dispense: 1 Tube; Refill: 0    2. History of lymphoma  Post treatment.     3. CKD (chronic kidney disease) stage 4, GFR 15-29 ml/min  Will 1/2 dose to Bactrim 400 secondary to kidney disease.

## 2018-10-05 NOTE — PATIENT INSTRUCTIONS
Abscess (Antibiotic Treatment Only)  An abscess (sometimes called a boil) happens when bacteria get trapped under the skin and start to grow. Pus forms inside the abscess as the body responds to the bacteria. An abscess can happen with an insect bite, ingrown hair, blocked oil gland, pimple, cyst, or puncture wound.  In the early stages, your wound may be red and tender. For this stage, you may get antibiotics. If the abscess does not get better with antibiotics, it will need to be drained with a small cut.  Home care  These tips will help you care for your abscess at home:  · Soak the wound in hot water or apply hot packs (small towel soaked in hot water) to the area for 20 minutes at a time. Do this 3 to 4 times a day.  · Do not cut, squeeze, or pop the boil yourself.  · Apply antibiotic cream or ointment to the skin 3 to 4 times a day, unless something else was prescribed. Some ointments include an antibiotic plus a pain reliever.  · If your doctor prescribed antibiotics, do not stop taking them until you have finished the medicine or the doctor tells you to stop.  · You may use an over-the-counter pain medicine to control pain, unless another pain medicine was prescribed. If you have chronic liver or kidney disease or ever had a stomach ulcer or gastrointestinal bleeding, talk with your doctor before using these any of these.  Follow-up care  Follow up with your healthcare provider, or as advised. Check your wound each day for the signs of worsening infection listed below.  When to seek medical advice  Get prompt medical attention if any of these occur:  · An increase in redness or swelling  · Red streaks in the skin leading away from the abscess  · An increase in local pain or swelling  · Fever of 100.4ºF (38ºC) or higher, or as directed by your healthcare provider  · Pus or fluid coming from the abscess  · Boil returns after getting better  Date Last Reviewed: 9/1/2016  © 6014-0929 [employer] [address] [place] [id number]. All rights reserved. This information is not intended as a substitute for professional medical care. Always follow your healthcare professional's instructions.

## 2018-10-08 ENCOUNTER — TELEPHONE (OUTPATIENT)
Dept: URGENT CARE | Facility: CLINIC | Age: 48
End: 2018-10-08

## 2019-04-23 ENCOUNTER — TELEPHONE (OUTPATIENT)
Dept: URGENT CARE | Facility: CLINIC | Age: 49
End: 2019-04-23

## 2021-04-21 ENCOUNTER — OFFICE VISIT (OUTPATIENT)
Dept: URGENT CARE | Facility: CLINIC | Age: 51
End: 2021-04-21
Payer: MEDICARE

## 2021-04-21 VITALS
HEART RATE: 84 BPM | SYSTOLIC BLOOD PRESSURE: 154 MMHG | HEIGHT: 69 IN | WEIGHT: 168 LBS | DIASTOLIC BLOOD PRESSURE: 82 MMHG | TEMPERATURE: 99 F | OXYGEN SATURATION: 98 % | BODY MASS INDEX: 24.88 KG/M2

## 2021-04-21 DIAGNOSIS — H10.31 ACUTE BACTERIAL CONJUNCTIVITIS OF RIGHT EYE: Primary | ICD-10-CM

## 2021-04-21 DIAGNOSIS — S05.01XA ABRASION OF RIGHT CORNEA, INITIAL ENCOUNTER: ICD-10-CM

## 2021-04-21 PROCEDURE — 90715 TDAP VACCINE 7 YRS/> IM: CPT | Mod: AT,S$GLB,, | Performed by: INTERNAL MEDICINE

## 2021-04-21 PROCEDURE — 90715 TDAP VACCINE GREATER THAN OR EQUAL TO 7YO IM: ICD-10-PCS | Mod: AT,S$GLB,, | Performed by: INTERNAL MEDICINE

## 2021-04-21 PROCEDURE — 90471 TDAP VACCINE GREATER THAN OR EQUAL TO 7YO IM: ICD-10-PCS | Mod: S$GLB,,, | Performed by: INTERNAL MEDICINE

## 2021-04-21 PROCEDURE — 99203 PR OFFICE/OUTPT VISIT, NEW, LEVL III, 30-44 MIN: ICD-10-PCS | Mod: 25,S$GLB,, | Performed by: INTERNAL MEDICINE

## 2021-04-21 PROCEDURE — 99203 OFFICE O/P NEW LOW 30 MIN: CPT | Mod: 25,S$GLB,, | Performed by: INTERNAL MEDICINE

## 2021-04-21 PROCEDURE — 90471 IMMUNIZATION ADMIN: CPT | Mod: S$GLB,,, | Performed by: INTERNAL MEDICINE

## 2021-04-21 RX ORDER — AMOXICILLIN AND CLAVULANATE POTASSIUM 875; 125 MG/1; MG/1
1 TABLET, FILM COATED ORAL EVERY 12 HOURS
Qty: 14 TABLET | Refills: 0 | Status: SHIPPED | OUTPATIENT
Start: 2021-04-21 | End: 2021-04-28

## 2021-04-21 RX ORDER — POLYMYXIN B SULFATE AND TRIMETHOPRIM 1; 10000 MG/ML; [USP'U]/ML
1 SOLUTION OPHTHALMIC 4 TIMES DAILY
Qty: 10 BOTTLE | Refills: 0 | Status: SHIPPED | OUTPATIENT
Start: 2021-04-21 | End: 2021-04-26

## 2021-12-05 ENCOUNTER — OFFICE VISIT (OUTPATIENT)
Dept: URGENT CARE | Facility: CLINIC | Age: 51
End: 2021-12-05
Payer: MEDICARE

## 2021-12-05 VITALS
RESPIRATION RATE: 18 BRPM | HEIGHT: 69 IN | TEMPERATURE: 99 F | HEART RATE: 81 BPM | OXYGEN SATURATION: 99 % | DIASTOLIC BLOOD PRESSURE: 73 MMHG | SYSTOLIC BLOOD PRESSURE: 139 MMHG | BODY MASS INDEX: 24.44 KG/M2 | WEIGHT: 165 LBS

## 2021-12-05 DIAGNOSIS — N50.89 TESTICULAR SWELLING, RIGHT: ICD-10-CM

## 2021-12-05 DIAGNOSIS — N45.1 EPIDIDYMITIS: Primary | ICD-10-CM

## 2021-12-05 LAB
BILIRUB UR QL STRIP: NEGATIVE
GLUCOSE SERPL-MCNC: 108 MG/DL (ref 70–110)
GLUCOSE UR QL STRIP: POSITIVE
KETONES UR QL STRIP: NEGATIVE
LEUKOCYTE ESTERASE UR QL STRIP: POSITIVE
PH, POC UA: 6 (ref 5–8)
POC BLOOD, URINE: POSITIVE
POC NITRATES, URINE: NEGATIVE
PROT UR QL STRIP: POSITIVE
SP GR UR STRIP: 1.02 (ref 1–1.03)
UROBILINOGEN UR STRIP-ACNC: NORMAL (ref 0.3–2.2)

## 2021-12-05 PROCEDURE — 96372 THER/PROPH/DIAG INJ SC/IM: CPT | Mod: S$GLB,,, | Performed by: FAMILY MEDICINE

## 2021-12-05 PROCEDURE — 96372 PR INJECTION,THERAP/PROPH/DIAG2ST, IM OR SUBCUT: ICD-10-PCS | Mod: S$GLB,,, | Performed by: FAMILY MEDICINE

## 2021-12-05 PROCEDURE — 99214 OFFICE O/P EST MOD 30 MIN: CPT | Mod: 25,S$GLB,, | Performed by: NURSE PRACTITIONER

## 2021-12-05 PROCEDURE — 81003 POCT URINALYSIS, DIPSTICK, AUTOMATED, W/O SCOPE: ICD-10-PCS | Mod: QW,S$GLB,, | Performed by: NURSE PRACTITIONER

## 2021-12-05 PROCEDURE — 82962 POCT GLUCOSE, HAND-HELD DEVICE: ICD-10-PCS | Mod: S$GLB,,, | Performed by: NURSE PRACTITIONER

## 2021-12-05 PROCEDURE — 99214 PR OFFICE/OUTPT VISIT, EST, LEVL IV, 30-39 MIN: ICD-10-PCS | Mod: 25,S$GLB,, | Performed by: NURSE PRACTITIONER

## 2021-12-05 PROCEDURE — 81003 URINALYSIS AUTO W/O SCOPE: CPT | Mod: QW,S$GLB,, | Performed by: NURSE PRACTITIONER

## 2021-12-05 PROCEDURE — 87086 URINE CULTURE/COLONY COUNT: CPT | Performed by: NURSE PRACTITIONER

## 2021-12-05 PROCEDURE — 82962 GLUCOSE BLOOD TEST: CPT | Mod: S$GLB,,, | Performed by: NURSE PRACTITIONER

## 2021-12-05 RX ORDER — CEFTRIAXONE 500 MG/1
500 INJECTION, POWDER, FOR SOLUTION INTRAMUSCULAR; INTRAVENOUS
Status: COMPLETED | OUTPATIENT
Start: 2021-12-05 | End: 2021-12-05

## 2021-12-05 RX ORDER — DOXYCYCLINE 100 MG/1
100 CAPSULE ORAL EVERY 12 HOURS
Qty: 20 CAPSULE | Refills: 0 | Status: SHIPPED | OUTPATIENT
Start: 2021-12-05 | End: 2021-12-15

## 2021-12-05 RX ADMIN — CEFTRIAXONE 500 MG: 500 INJECTION, POWDER, FOR SOLUTION INTRAMUSCULAR; INTRAVENOUS at 12:12

## 2021-12-08 LAB
BACTERIA UR CULT: NORMAL
BACTERIA UR CULT: NORMAL

## 2021-12-10 ENCOUNTER — TELEPHONE (OUTPATIENT)
Dept: URGENT CARE | Facility: CLINIC | Age: 51
End: 2021-12-10
Payer: MEDICARE

## 2022-01-12 PROBLEM — N43.3 HYDROCELE, RIGHT: Status: ACTIVE | Noted: 2022-01-12

## 2022-02-02 PROBLEM — K40.90 REDUCIBLE RIGHT INGUINAL HERNIA: Status: ACTIVE | Noted: 2022-02-02

## 2023-05-09 ENCOUNTER — TELEPHONE (OUTPATIENT)
Dept: HEMATOLOGY/ONCOLOGY | Facility: CLINIC | Age: 53
End: 2023-05-09
Payer: MEDICARE

## 2023-05-09 NOTE — TELEPHONE ENCOUNTER
Discussed with Dr. Roland who states pt will need visit if dental clearance is required.    Pt states dental office is inquiring if patient has received bisphosphonate previously. Patient states dental office ([facility] in [place]) requiring signed letter regarding bisphosphonate use.     Discussed with Dr. Roland who is agreeable to signed letter.    Attempted to reach [facility] in [place] but office was closed. Letter reviewed and signed per Dr. Roland.     Notified pt of the above. Patient provided RN with fax number for [facility]. RN will fax letter. Pt declines follow up with Dr. Roland at this time.    Dr. Roland notified pt declined follow up. No new orders at this time per Dr. Roland.

## 2023-05-09 NOTE — TELEPHONE ENCOUNTER
----- Message from Cheikh Cheung sent at 5/9/2023  4:25 PM CDT -----  Regarding: Returning a Missed Call      Caller: Erick August      Returning call to: ZIA Tinajero       Caller can be reached @: [phone]      Nature of the call: Patient returning call to ZIA Tinajero regarding dental clearance.

## 2023-05-09 NOTE — LETTER
May 9, 2023    Erick August  [address] [facility] [place] [address]             [facility] - Hematology [address] [facility] [facility] [address]  Phone: [phone] To Whom It May Concern:    Mr. Erick August was treated for Nodular sclerosis Hodgkin lymphoma of spleen in 2016 at [facility] by Dr. Hanna. Between 2/19/16 - 7/3/2016, he  completed six cycles of ICE chemotherapy. I assumed care of the patient in November 2017. This is also when he was last seen at our clinic.     Regarding bisphosphonates, he received one dose of Zometa on 2/18/16.     If the patient requires dental clearance prior to dental extractions, please notify Mr. August, so that we may assist with scheduling. Please call us with any questions or concerns. We can be reached at [phone].    Sincerely,        Ernie Roland MD

## 2023-05-09 NOTE — TELEPHONE ENCOUNTER
"----- Message from Shannan Chris sent at 5/9/2023  1:24 PM CDT -----  Regarding: Pt advice  Contact: Pt     Pt requesting a call back in regards to chemo treatment received back in 2015. Pt stated he need dental work, and the dental office requesting paperwork stating which chemo pt received.   Please call and adv      Confirmed contact below:   Contact Name: Erick LEVI Mariangel  Phone Number: [phone]               Additional Notes:  "Thank you for all that you do for our patients"                                         "

## 2023-05-10 NOTE — TELEPHONE ENCOUNTER
----- Message from T[place]ontana Chevalier sent at 5/10/2023 10:04 AM CDT -----  Regarding: fax numb  Consult/Advisory    Name Of Caller:  self      Contact Preference:  pls call pt at  once fax has been sent    Nature of call:  Pt calling to give correct fax # for [facility] in [place]. The fax is , phone  [phone]. Pls also fax dental clearance.